# Patient Record
Sex: MALE | Race: WHITE | NOT HISPANIC OR LATINO | Employment: OTHER | URBAN - METROPOLITAN AREA
[De-identification: names, ages, dates, MRNs, and addresses within clinical notes are randomized per-mention and may not be internally consistent; named-entity substitution may affect disease eponyms.]

---

## 2018-08-23 ENCOUNTER — HOSPITAL ENCOUNTER (EMERGENCY)
Facility: CLINIC | Age: 38
Discharge: HOME | End: 2018-08-24
Attending: EMERGENCY MEDICINE | Admitting: EMERGENCY MEDICINE

## 2018-08-23 DIAGNOSIS — T15.91XA FOREIGN BODY OF RIGHT EYE, INITIAL ENCOUNTER: ICD-10-CM

## 2018-08-23 DIAGNOSIS — S05.01XA ABRASION OF RIGHT CORNEA, INITIAL ENCOUNTER: ICD-10-CM

## 2018-08-23 PROCEDURE — 99283 EMERGENCY DEPT VISIT LOW MDM: CPT | Mod: Z6 | Performed by: EMERGENCY MEDICINE

## 2018-08-23 PROCEDURE — 99283 EMERGENCY DEPT VISIT LOW MDM: CPT | Performed by: EMERGENCY MEDICINE

## 2018-08-23 NOTE — ED AVS SNAPSHOT
[facility], Emergency Department    [address]    [place] [address]    Phone:  [phone]    Fax:  [phone]                                       Celso Mathis   MRN: [id number]    Department:  [facility], Emergency Department   Date of Visit:  8/23/2018           Patient Information     Date Of Birth          1980        Your diagnoses for this visit were:     Foreign body of right eye, initial encounter     Abrasion of right cornea, initial encounter        You were seen by Jame Cannon MD.        Discharge Instructions         Use polytrim eye drops as directed.  Use homatropine eyedrops 3 times a day to dilate your eye.  You will need to wear sunglasses outside.  Use artificial tears as needed.  Follow-up with eye clinic tomorrow.  You will be contacted with an appointment time.  Please make an appointment to follow up with Eye Clinic (phone: [phone]).      Discharge References/Attachments     CORNEAL FOREIGN BODY, REMOVED, W/ RUST RING (ENGLISH)      24 Hour Appointment Hotline       To make an appointment at any [place] clinic, call [phone] ([phone]). If you don't have a family doctor or clinic, we will help you find one. [place] clinics are conveniently located to serve the needs of you and your family.             Review of your medicines      START taking        Dose / Directions Last dose taken    homatropine 2 % ophthalmic solution   Dose:  1-2 drop   Quantity:  1 mL        Place 1-2 drops into the right eye 3 times daily   Refills:  0        polyvinyl alcohol 1.4 % ophthalmic solution   Commonly known as:  LIQUIFILM TEARS   Dose:  1 drop   Quantity:  15 mL        Place 1 drop into the right eye as needed for dry eyes   Refills:  0        trimethoprim-polymyxin b ophthalmic solution   Commonly known as:  POLYTRIM   Dose:  1 drop   Quantity:  1 Bottle        Apply 1 drop to eye every 3 hours for 7 days Use while awake.   Refills:  0          Our records  "show that you are taking the medicines listed below. If these are incorrect, please call your family doctor or clinic.        Dose / Directions Last dose taken    HYDROcodone-acetaminophen 5-325 MG per tablet   Commonly known as:  NORCO   Dose:  1 tablet   Quantity:  12 tablet        Take 1 tablet by mouth every 6 hours as needed for moderate to severe pain   Refills:  0                Prescriptions were sent or printed at these locations (3 Prescriptions)                   Other Prescriptions                Printed at Department/Unit printer (3 of 3)         homatropine 2 % ophthalmic solution               polyvinyl alcohol (LIQUIFILM TEARS) 1.4 % ophthalmic solution               trimethoprim-polymyxin b (POLYTRIM) ophthalmic solution                Orders Needing Specimen Collection     None      Pending Results     No orders found for last 3 day(s).            Pending Culture Results     No orders found for last 3 day(s).            Pending Results Instructions     If you had any lab results that were not finalized at the time of your Discharge, you can call the ED Lab Result RN at [phone]. You will be contacted by this team for any positive Lab results or changes in treatment. The nurses are available 7 days a week from 10A to 6:30P.  You can leave a message 24 hours per day and they will return your call.        Thank you for choosing [facility]       Thank you for choosing [facility] for your care. Our goal is always to provide you with excellent care. Hearing back from our patients is one way we can continue to improve our services. Please take a few minutes to complete the written survey that you may receive in the mail after you visit with us. Thank you!        [employer] Information     [employer] lets you send messages to your doctor, view your test results, renew your prescriptions, schedule appointments and more. To sign up, go to [email or web address] . Click on \"Log in\" on the left side of the screen, " "which will take you to the Welcome page. Then click on \"Sign up Now\" on the right side of the page.     You will be asked to enter the access code listed below, as well as some personal information. Please follow the directions to create your username and password.     Your access code is: [email or web address]  Expires: 2018  2:04 AM     Your access code will  in 90 days. If you need help or a new code, please call your [place] clinic or [phone].        Care EveryWhere ID     This is your Care EveryWhere ID. This could be used by other organizations to access your [place] medical records  [id number]        Equal Access to Services     SHAHAB YOO : Neha Gee, meredith hendrix, narinder canales, taz cook. So [facility] [phone].    ATENCIÓN: Si habla español, tiene a garsia disposición servicios gratuitos de asistencia lingüística. Llame al [phone].    We comply with applicable federal civil rights laws and [place] laws. We do not discriminate on the basis of race, color, national origin, age, disability, sex, sexual orientation, or gender identity.            After Visit Summary       This is your record. Keep this with you and show to your community pharmacist(s) and doctor(s) at your next visit.                  "

## 2018-08-23 NOTE — ED AVS SNAPSHOT
[facility], [place], Emergency Department    [address]    [place] [address]    Phone:  [phone]    Fax:  [phone]                                       Celso Mathis   MRN: [id number]    Department:  [facility], Emergency Department   Date of Visit:  8/23/2018           After Visit Summary Signature Page     I have received my discharge instructions, and my questions have been answered. I have discussed any challenges I see with this plan with the nurse or doctor.    ..........................................................................................................................................  Patient/Patient Representative Signature      ..........................................................................................................................................  Patient Representative Print Name and Relationship to Patient    ..................................................               ................................................  Date                                            Time    ..........................................................................................................................................  Reviewed by Signature/Title    ...................................................              ..............................................  Date                                                            Time          22EPIC Rev 08/18

## 2018-08-24 ENCOUNTER — OFFICE VISIT (OUTPATIENT)
Dept: OPHTHALMOLOGY | Facility: CLINIC | Age: 38
End: 2018-08-24

## 2018-08-24 VITALS
BODY MASS INDEX: 23.94 KG/M2 | SYSTOLIC BLOOD PRESSURE: 114 MMHG | DIASTOLIC BLOOD PRESSURE: 75 MMHG | HEART RATE: 69 BPM | WEIGHT: 171 LBS | TEMPERATURE: 98.1 F | HEIGHT: 71 IN | OXYGEN SATURATION: 99 % | RESPIRATION RATE: 16 BRPM

## 2018-08-24 DIAGNOSIS — S05.8X1D SUPERFICIAL INJURY OF RIGHT CORNEA, SUBSEQUENT ENCOUNTER: ICD-10-CM

## 2018-08-24 DIAGNOSIS — T15.01XD FOREIGN BODY OF RIGHT CORNEA, SUBSEQUENT ENCOUNTER: Primary | ICD-10-CM

## 2018-08-24 PROCEDURE — 25000125 ZZHC RX 250: Performed by: EMERGENCY MEDICINE

## 2018-08-24 PROCEDURE — 90715 TDAP VACCINE 7 YRS/> IM: CPT | Performed by: EMERGENCY MEDICINE

## 2018-08-24 PROCEDURE — 90471 IMMUNIZATION ADMIN: CPT

## 2018-08-24 PROCEDURE — 25000128 H RX IP 250 OP 636: Performed by: EMERGENCY MEDICINE

## 2018-08-24 RX ORDER — PROPARACAINE HYDROCHLORIDE 5 MG/ML
1 SOLUTION/ DROPS OPHTHALMIC ONCE
Status: COMPLETED | OUTPATIENT
Start: 2018-08-24 | End: 2018-08-24

## 2018-08-24 RX ORDER — ERYTHROMYCIN 5 MG/G
0.5 OINTMENT OPHTHALMIC 4 TIMES DAILY
Qty: 3.5 G | Refills: 0 | Status: SHIPPED | OUTPATIENT
Start: 2018-08-24 | End: 2018-08-24

## 2018-08-24 RX ORDER — POLYMYXIN B SULFATE AND TRIMETHOPRIM 1; 10000 MG/ML; [USP'U]/ML
1 SOLUTION OPHTHALMIC
Qty: 1 BOTTLE | Refills: 0 | Status: SHIPPED | OUTPATIENT
Start: 2018-08-24 | End: 2018-08-24

## 2018-08-24 RX ORDER — POLYVINYL ALCOHOL 14 MG/ML
1 SOLUTION/ DROPS OPHTHALMIC PRN
Qty: 15 ML | Refills: 0 | Status: SHIPPED | OUTPATIENT
Start: 2018-08-24 | End: 2023-01-12

## 2018-08-24 RX ORDER — POLYMYXIN B SULFATE AND TRIMETHOPRIM 1; 10000 MG/ML; [USP'U]/ML
1 SOLUTION OPHTHALMIC 4 TIMES DAILY
Qty: 1 BOTTLE | Refills: 0 | Status: SHIPPED | OUTPATIENT
Start: 2018-08-24 | End: 2019-05-03

## 2018-08-24 RX ADMIN — PROPARACAINE HYDROCHLORIDE 1 DROP: 5 SOLUTION/ DROPS OPHTHALMIC at 00:17

## 2018-08-24 RX ADMIN — CLOSTRIDIUM TETANI TOXOID ANTIGEN (FORMALDEHYDE INACTIVATED), CORYNEBACTERIUM DIPHTHERIAE TOXOID ANTIGEN (FORMALDEHYDE INACTIVATED), BORDETELLA PERTUSSIS TOXOID ANTIGEN (GLUTARALDEHYDE INACTIVATED), BORDETELLA PERTUSSIS FILAMENTOUS HEMAGGLUTININ ANTIGEN (FORMALDEHYDE INACTIVATED), BORDETELLA PERTUSSIS PERTACTIN ANTIGEN, AND BORDETELLA PERTUSSIS FIMBRIAE 2/3 ANTIGEN 0.5 ML: 5; 2; 2.5; 5; 3; 5 INJECTION, SUSPENSION INTRAMUSCULAR at 00:40

## 2018-08-24 RX ADMIN — FLUORESCEIN SODIUM 1 STRIP: 1 STRIP OPHTHALMIC at 00:17

## 2018-08-24 ASSESSMENT — ENCOUNTER SYMPTOMS
EYE DISCHARGE: 1
EYE PAIN: 1
EYE REDNESS: 1

## 2018-08-24 ASSESSMENT — VISUAL ACUITY
OS_SC+: -1
OD_SC: 20/20
OD: 20/70
METHOD: SNELLEN - LINEAR
OS: 20/40
OS_SC: 20/20
OD_SC+: -1

## 2018-08-24 ASSESSMENT — SLIT LAMP EXAM - LIDS
COMMENTS: NORMAL
COMMENTS: NORMAL

## 2018-08-24 ASSESSMENT — EXTERNAL EXAM - LEFT EYE: OS_EXAM: NORMAL

## 2018-08-24 ASSESSMENT — CONF VISUAL FIELD
OS_NORMAL: 1
OD_NORMAL: 1

## 2018-08-24 ASSESSMENT — EXTERNAL EXAM - RIGHT EYE: OD_EXAM: NORMAL

## 2018-08-24 ASSESSMENT — TONOMETRY
IOP_METHOD: ICARE
OD_IOP_MMHG: 10
OS_IOP_MMHG: 12

## 2018-08-24 NOTE — NURSING NOTE
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

## 2018-08-24 NOTE — ED PROVIDER NOTES
"  History     Chief Complaint   Patient presents with     Foreign Body in Eye     patient reported he was grinding concrete around noon, something went to his right eye. c/o right eye  redness and slight pain     HPI  Celso Mathis is a 38 year old male who was department with a foreign body sensation in his right eye.  The patient states that he was grinding stucco today with a wire mesh embedded in it when he felt a foreign body sensation in his right eye.  He is attempted to irrigate it use eyedrops with persistent symptoms.  He complains of tearing and redness.  He reports some mild blurring of his vision as well.  He is not wearing protective eyewear at the time.  His last tetanus immunization was approximately 10 years ago.  Patient denies contact lens use.  He denies any other injury.  He denies any left eye symptoms.    I have reviewed the Medications, Allergies, Past Medical and Surgical History, and Social History in the Epic system.    Review of Systems   Eyes: Positive for pain, discharge and redness.   All other systems reviewed and are negative.      Physical Exam   BP: 133/89  Pulse: 82  Temp: 98.1  F (36.7  C)  Resp: 19  Height: 180.3 cm (5' 11\")  Weight: 77.6 kg (171 lb)  SpO2: 98 %  Visual Acuity-Left: 20/40  Visual Acuity-Right: 20/70      Physical Exam   Constitutional: He appears well-developed and well-nourished.   HENT:   Head: Normocephalic and atraumatic.   Eyes: Pupils are equal, round, and reactive to light. Foreign body present in the right eye. Right conjunctiva is injected. Right eye exhibits normal extraocular motion.   Slit lamp exam:       The right eye shows corneal abrasion, foreign body and fluorescein uptake. The right eye shows no hyphema and no hypopyon.       Right upper eyelid everted.  No foreign body under lid.   Neurological: He is alert. He has normal strength. Gait normal.   Skin: Skin is warm and dry.   Nursing note and vitals reviewed.      ED Course     ED Course "     Procedures        Foreign body removed with cotton tip applicator.  Residual rust ring present.    Critical Care time:    Medications   proparacaine (ALCAINE) 0.5 % ophthalmic solution 1 drop (1 drop Right Eye Given by Other Clinician 8/24/18 0017)   fluorescein 1 mg (FUL-ROSMERY) ophthalmic strip 1 strip (1 strip Both Eyes Given by Other Clinician 8/24/18 0017)   Tdap (tetanus-diphtheria-acell pertussis) (ADACEL) injection 0.5 mL (0.5 mLs Intramuscular Given 8/24/18 0040)      Discussed with ophthalmology who evaluated the patient in the emergency department.  See note for complete details.       Labs Ordered and Resulted from Time of ED Arrival Up to the Time of Departure from the ED - No data to display         Assessments & Plan (with Medical Decision Making)   38 year old male to the emergency department with right eye pain and redness after grinding stucco with wire mesh today at work.  The patient's tetanus status was updated.  He will be discharged home with Polytrim eyedrops, homatropine eyedrops, and artificial tears..  Ophthalmology follow-up for rust ring removal.  I have reviewed the nursing notes.    I have reviewed the findings, diagnosis, plan and need for follow up with the patient.    New Prescriptions    HOMATROPINE 2 % OPHTHALMIC SOLUTION    Place 1-2 drops into the right eye 3 times daily    POLYVINYL ALCOHOL (LIQUIFILM TEARS) 1.4 % OPHTHALMIC SOLUTION    Place 1 drop into the right eye as needed for dry eyes    TRIMETHOPRIM-POLYMYXIN B (POLYTRIM) OPHTHALMIC SOLUTION    Apply 1 drop to eye every 3 hours for 7 days Use while awake.       Final diagnoses:   Foreign body of right eye, initial encounter   Abrasion of right cornea, initial encounter       8/23/2018   [facility], [place], EMERGENCY DEPARTMENT     Jame Cannon MD  08/24/18 5431

## 2018-08-24 NOTE — CONSULTS
OPHTHALMOLOGY CONSULT NOTE  08/24/18    Patient: Celso Mathis  Consulted by: [facility]  Reason for Consult: corneal foreign body    HISTORY OF PRESENTING ILLNESS:     Celso Mathis is a 38 year old male who presents to ED with RIGHT eye pain. States that he was cutting stucco this afternoon when he felt a vijaya in his RIGHT eye. Had FBS with blinking and eye irritation. Attempted frequent AT's without improvement.     States vision is a little blurry and has some tearing in his right eye. No photophobia. No deep eye pain or pain with eye movement, no diplopia. Denies a gush of fluid at time of incident.     No complaints with LEFT eye.    Per ED staff, was able to identify and remove a metallic foreign body from RIGHT cornea at 2 o'clock position. Rust ring remained.       Review of systems were otherwise negative except for that which has been stated above.      OCULAR/MEDICAL/SURGICAL HISTORIES:     Past Ocular History:  None, no glasses or contacts    History reviewed. No pertinent past medical history.    Past Surgical History:   Procedure Laterality Date     ORTHOPEDIC SURGERY  2010    Large bruise that had to be evacuated.       EXAMINATION:     Visual Acuity: Right Eye 20/25 -2 ; Left Eye 20/25 -1   Pupils: Equal and reactive to light and accomodation with no afferent pupillary defect.  - 3mm dark to 2mm light each eye, brisk, no APD    Intraocular Pressure: RE  10 ; LE 10  mmHg by tonopen (<5% error).   Motility: RE Full; LE Full  Confrontational Visual Field: RE Full; LE Full     External/Slit Lamp Exam   RIGHT EYE   Lids/Lashes: No Abnormality, no foreign body appreciated on eversion of upper/lower lid   Conj/Sclera: diffuse 2+ injection, no appreciated foreign body in fornix   Cornea:  0.3mm superficial loreto brown lesion at 2 o'clock position just out of visual axis (metallic FB removed by ED staff with q-tip), only superficial involvement into stroma (shadow from rust ring appreciated), no other  appreciated epithelial defect on exam with fluorescein    Ant Chamber:  Deep and Quiet with no cell/flare appreciated   Iris: Round and Reactive   Lens: Clear  LEFT   Lids/lashes: No Abnormality   Conj/Sclera: 1+ injection    Cornea:  Clear, no fluorescein uptake   Ant Chamber:  Deep and Quiet   Iris: Round and Reactive   Lens:Clear    Dilated Fundus Exam  Eyes Dilated? no    RIGHT:   Anterior Vitreous: Clear   Media: Clear   Optic Nerve: Normal Contours and Size    Labs/Studies/Imaging Performed  None     ASSESSMENT/PLAN:     Celso Mathis is a 38 year old male who presents with corneal foreign body, RIGHT eye, after cutting stucco/metal without eye protection this afternoon. VA and IOP good, no concern for ruptured globe. Foreign body removed by ED staff, residual rust stain present (0.3mm), with no associated AC rxn or hyphema. Will send home on antibiotic ointment and cycloplegic for photosensitivity. Will plan to follow-up in 1-3 days. Counseled patient on importance of returning if pain fails to improve or vision becomes compromised.     PLAN:   - Polysporin ointment TID, RIGHT eye  - Homatropine 5% TID, RIGHT eye  - Frequent (q1-2 hr) artificial tears, each eye   - Follow-up in 1-3 days in clinic (will have clinic arrange with patient)  - Return ASAP if pain worsens or vision compromise     It is our pleasure to participate in this patient's care and treatment. Please contact us with any further questions or concerns.    Care plan discussed with Dr. Guerin.    Mario Alberto Peterson MD  Ophthalmology Resident  PGY-2

## 2018-08-24 NOTE — MR AVS SNAPSHOT
After Visit Summary   2018    Celso Mathis    MRN: [id number]           Patient Information     Date Of Birth          1980        Visit Information        Provider Department      2018 10:40 AM Yoan Lawson OD [facility]        Today's Diagnoses     Foreign body of right cornea, subsequent encounter    -  1    Superficial injury of right cornea, subsequent encounter           Follow-ups after your visit        Follow-up notes from your care team     Return in about 4 days (around 2018) for Follow Up.      Your next 10 appointments already scheduled     Aug 28, 2018  5:00 PM CDT   (Arrive by 4:45 PM)   RETURN GENERAL with [facility] ([facility])    [address] [facility]  [address] [facility] [address] [phone]              Who to contact     Please call your clinic at [phone] to:    Ask questions about your health    Make or cancel appointments    Discuss your medicines    Learn about your test results    Speak to your doctor            Additional Information About Your Visit        MyChart Information     [employer] is an electronic gateway that provides easy, online access to your medical records. With [employer], you can request a clinic appointment, read your test results, renew a prescription or communicate with your care team.     To sign up for [employer] visit the website at [email or web address]   You will be asked to enter the access code listed below, as well as some personal information. Please follow the directions to create your username and password.     Your access code is: [email or web address]  Expires: 2018  2:04 AM     Your access code will  in 90 days. If you need help or a new code, please contact your [facility] or call [phone] for assistance.        Care EveryWhere ID     This is your Care EveryWhere ID. This could be used by other  organizations to access your [place] medical records  [id number]         Blood Pressure from Last 3 Encounters:   08/24/18 114/75   08/27/16 (!) 134/93   06/17/15 (!) 145/95    Weight from Last 3 Encounters:   08/24/18 77.6 kg (171 lb)   08/27/16 83.9 kg (185 lb)   06/16/15 83.9 kg (185 lb)              We Performed the Following     Foreign Body Removal, Cornea w/ Slit Lamp          Today's Medication Changes          These changes are accurate as of 8/24/18 11:59 PM.  If you have any questions, ask your nurse or doctor.               These medicines have changed or have updated prescriptions.        Dose/Directions    trimethoprim-polymyxin b ophthalmic solution   Commonly known as:  POLYTRIM   This may have changed:  when to take this   Used for:  Superficial injury of right cornea, subsequent encounter   Changed by:  Yoan Lawson, OD        Dose:  1 drop   Apply 1 drop to eye 4 times daily Use while awake.   Quantity:  1 Bottle   Refills:  0            Where to get your medicines      These medications were sent to [facility] [address] [place] [address] [address] [address]    Hours:  TRANSPLANT PHONE NUMBER [phone] Phone:  [phone]     trimethoprim-polymyxin b ophthalmic solution                Primary Care Provider Fax #    Physician No Ref-Primary [phone]       No address on file        Equal Access to Services     SHAHAB YOO AH: Neha Gee, waaxda luqadaha, qaybta kaalmada taz canales. So [facility] [phone].    ATENCIÓN: Si habla español, tiene a garsia disposición servicios gratuitos de asistencia lingüística. Llame al [phone].    We comply with applicable federal civil rights laws and [place] laws. We do not discriminate on the basis of race, color, national origin, age, disability, sex, sexual orientation, or gender identity.            Thank you!      Thank you for choosing [facility]OLOG[facility]  for your care. Our goal is always to provide you with excellent care. Hearing back from our patients is one way we can continue to improve our services. Please take a few minutes to complete the written survey that you may receive in the mail after your visit with us. Thank you!             Your Updated Medication List - Protect others around you: Learn how to safely use, store and throw away your medicines at www.disposemymeds.org.          This list is accurate as of 8/24/18 11:59 PM.  Always use your most recent med list.                   Brand Name Dispense Instructions for use Diagnosis    homatropine 2 % ophthalmic solution     1 mL    Place 1-2 drops into the right eye 3 times daily        HYDROcodone-acetaminophen 5-325 MG per tablet    NORCO    12 tablet    Take 1 tablet by mouth every 6 hours as needed for moderate to severe pain    Pain of right lower extremity, Hematoma of hip, right, initial encounter       polyvinyl alcohol 1.4 % ophthalmic solution    LIQUIFILM TEARS    15 mL    Place 1 drop into the right eye as needed for dry eyes        trimethoprim-polymyxin b ophthalmic solution    POLYTRIM    1 Bottle    Apply 1 drop to eye 4 times daily Use while awake.    Superficial injury of right cornea, subsequent encounter

## 2018-08-24 NOTE — DISCHARGE INSTRUCTIONS
Use polytrim eye drops as directed.  Use homatropine eyedrops 3 times a day to dilate your eye.  You will need to wear sunglasses outside.  Use artificial tears as needed.  Follow-up with eye clinic tomorrow.  You will be contacted with an appointment time.  Please make an appointment to follow up with Eye Clinic (phone: [phone]).

## 2018-08-27 NOTE — PROGRESS NOTES
Assessment/Plan  (T15.01XD) Foreign body of right cornea, subsequent encounter  (primary encounter diagnosis)  Comment: Remaining metallic foreign body removed successfully with golf club spud, rust ring removed with [place] brush, resulting abrasion ~1mm  Plan: Foreign Body Removal, Cornea w/ Slit Lamp         Educated patient on clinical findings. Foreign body removed successfully. Prescribed Polytrim qid right eye and recommended non-preserved artificial tears q1h both eyes. Return to clinic in 4 days (offered 3, patient deferred) for follow-up, or sooner if symptoms worsen.    (S05.8X1D) Superficial injury of right cornea, subsequent encounter  Comment: See above  Plan: trimethoprim-polymyxin b (POLYTRIM) ophthalmic solution    Return to clinic in 4 days for follow-up.    Complete documentation of historical and exam elements from today's encounter can  be found in the full encounter summary report (not reduplicated in this progress  note). I personally obtained the chief complaint(s) and history of present illness. I  confirmed and edited as necessary the review of systems, past medical/surgical  history, family history, social history, and examination findings as documented by  others; and I examined the patient myself. I personally reviewed the relevant tests,  images, and reports as documented above. I formulated and edited as necessary the  assessment and plan and discussed the findings and management plan with the  patient and family.    Yoan Lawson OD, FAAO

## 2018-08-30 ENCOUNTER — TELEPHONE (OUTPATIENT)
Dept: OPHTHALMOLOGY | Facility: CLINIC | Age: 38
End: 2018-08-30

## 2019-05-03 ENCOUNTER — HOSPITAL ENCOUNTER (EMERGENCY)
Facility: CLINIC | Age: 39
Discharge: HOME | End: 2019-05-03
Attending: NURSE PRACTITIONER | Admitting: NURSE PRACTITIONER

## 2019-05-03 VITALS
TEMPERATURE: 98 F | OXYGEN SATURATION: 99 % | HEIGHT: 70 IN | DIASTOLIC BLOOD PRESSURE: 80 MMHG | SYSTOLIC BLOOD PRESSURE: 131 MMHG | BODY MASS INDEX: 24.34 KG/M2 | WEIGHT: 170 LBS | RESPIRATION RATE: 16 BRPM

## 2019-05-03 DIAGNOSIS — T15.92XA FOREIGN BODY OF LEFT EYE, INITIAL ENCOUNTER: ICD-10-CM

## 2019-05-03 PROCEDURE — 99283 EMERGENCY DEPT VISIT LOW MDM: CPT

## 2019-05-03 PROCEDURE — 25000125 ZZHC RX 250: Performed by: NURSE PRACTITIONER

## 2019-05-03 PROCEDURE — 65205 REMOVE FOREIGN BODY FROM EYE: CPT | Mod: LT

## 2019-05-03 PROCEDURE — 25000125 ZZHC RX 250

## 2019-05-03 RX ORDER — POLYMYXIN B SULFATE AND TRIMETHOPRIM 1; 10000 MG/ML; [USP'U]/ML
1-2 SOLUTION OPHTHALMIC EVERY 4 HOURS
Qty: 1 BOTTLE | Refills: 0 | Status: SHIPPED | OUTPATIENT
Start: 2019-05-03 | End: 2019-05-08

## 2019-05-03 RX ORDER — PROPARACAINE HYDROCHLORIDE 5 MG/ML
2 SOLUTION/ DROPS OPHTHALMIC ONCE
Status: COMPLETED | OUTPATIENT
Start: 2019-05-03 | End: 2019-05-03

## 2019-05-03 RX ADMIN — FLUORESCEIN SODIUM: 1 STRIP OPHTHALMIC at 15:40

## 2019-05-03 RX ADMIN — PROPARACAINE HYDROCHLORIDE 2 DROP: 5 SOLUTION/ DROPS OPHTHALMIC at 15:20

## 2019-05-03 ASSESSMENT — ENCOUNTER SYMPTOMS
VOMITING: 0
FEVER: 0
EYE ITCHING: 1
EYE DISCHARGE: 1
HEADACHES: 0
EYE PAIN: 1
NAUSEA: 0

## 2019-05-03 ASSESSMENT — MIFFLIN-ST. JEOR: SCORE: 1702.36

## 2019-05-03 NOTE — ED PROVIDER NOTES
"  History     Chief Complaint:  Foreign Body in Eye       HPI   Celso Mathis is a 39 year old male who presents with left eye pain. Patient was seen at Urgent Care earlier today for removal of foreign body without success. Sent to emergency department for further cares. 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 This happened to him last year, and he did not present because he did not want to incur a large bill. Endorses eye pain, itching, and watering. Patient does not wear contacts. Denies visual changes.     Tdap: Up to date, per patient     Allergies:  Penicillins      Medications:    The patient is currently on no regular medications.     Past Medical History:    Rhabdomyolysis   Hematoma of hip    Past Surgical History:    Orthopedic surgery     Family History:    History reviewed. No pertinent family history.      Social History:  The patient was alone.  Smoking Status: Current every day smoker 1.00 PPD   Smokeless Tobacco: Never  Alcohol Use: No   Marital Status:  Single      Review of Systems   Constitutional: Negative for fever.   Eyes: Positive for pain, discharge (watering) and itching. Negative for visual disturbance.   Gastrointestinal: Negative for nausea and vomiting.   Neurological: Negative for headaches.   All other systems reviewed and are negative.      Physical Exam     Patient Vitals for the past 24 hrs:   BP Temp Temp src Heart Rate Resp SpO2 Height Weight   05/03/19 1524 131/80 98  F (36.7  C) Oral 56 16 99 % 1.778 m (5' 10\") 77.1 kg (170 lb)      Physical Exam  General: Alert, no obvious discomfort, well kept   HENT:  Normal voice, No lymphadenopathy. Foreign body left eye approximately 7 o'clock. After removal, ulceration in this area. No rust ring.  Fluorescein slit lamp exam  Eyes:  The pupils are equal, round, and reactive to light, Conjunctiva normal, No scleral icterus   Neck:  Normal " range of motion  CV:  Normal Pulses  Resp:  Non-labored, No cough  MS:  Normal muscular tone, moves all extremities  Skin:  No rash or acute skin lesions noted  Neuro: Speech is normal and fluent  Psych:  Awake. Alert.  Normal affect.  Appropriate interactions. Good eye contact    Emergency Department Course     Procedures:    Foreign Body Removal        LOCATION:  Left eye       FOREIGN BODY: Metal/[place]    PROCEDURE: Eye was prepped with 2 drops proparacaine. The small piece of metal/contrete was removed using the beveled edge of an 18 gauge needle and the foreign body was successfully removed. There were no immediate complications. The patient tolerated the procedure well.     Interventions:  1527 - Proparacaine 0.5% ophthalmic solution 2 drops, left eye   1534 - fluorescein 1mg ophthalmic strip      Emergency Department Course:  Nursing notes and vitals reviewed.    1527: I performed an exam of the patient as documented above.     1531: Removed foreign body.     1534: Took patient to eye room for further evaluation.     Findings and plan explained to the Patient. Patient discharged home with instructions regarding supportive care, medications, and reasons to return. The importance of close follow-up was reviewed. The patient was prescribed Polytrim ophthalmic solution.     Impression & Plan      Medical Decision Making:  Celso Mathis is a 39 year old male who presents for evaluation of a foreign body in the left eye.  This was successfully removed, see above procedure note. No signs of complications of the foreign body including abscess, cellulitis, necrotizing fascitis, penetration of vascular or nerve structures, etc.  Patient is more comfortable after removal.  Will have them follow up with primary care for wound check.  Risk of infection discussed.      Diagnosis:    ICD-10-CM   1. Foreign body of left eye, initial encounter T15.92XA       Disposition:  Discharged to home.     Discharge  Medications:  trimethoprim-polymyxin b 78211-3.1 UNIT/ML-% ophthalmic solution  Commonly known as:  POLYTRIM  1-2 drops, Left Eye, EVERY 4 HOURS  What changed:      how much to take    how to take this    when to take this    additional instructions       Scribe Disclosure:  I, Ana Maria Bolden, am serving as a scribe at 3:21 PM on 5/3/2019 to document services personally performed by Willie Ceja APRN based on my observations and the provider's statements to me.  5/3/2019    EMERGENCY DEPARTMENT       Willie Ceja APRN CNP  05/03/19 7844

## 2019-05-03 NOTE — ED AVS SNAPSHOT
Emergency Department  [address] [place] [address]  Phone:  [phone]  Fax:  [phone]                                    Celso Mathis   MRN: [id number]    Department:   Emergency Department   Date of Visit:  5/3/2019           After Visit Summary Signature Page    I have received my discharge instructions, and my questions have been answered. I have discussed any challenges I see with this plan with the nurse or doctor.    ..........................................................................................................................................  Patient/Patient Representative Signature      ..........................................................................................................................................  Patient Representative Print Name and Relationship to Patient    ..................................................               ................................................  Date                                   Time    ..........................................................................................................................................  Reviewed by Signature/Title    ...................................................              ..............................................  Date                                               Time          22EPIC Rev 08/18

## 2020-10-08 ENCOUNTER — HOSPITAL ENCOUNTER (EMERGENCY)
Facility: CLINIC | Age: 40
Discharge: HOME | End: 2020-10-08
Attending: EMERGENCY MEDICINE | Admitting: EMERGENCY MEDICINE

## 2020-10-08 ENCOUNTER — APPOINTMENT (OUTPATIENT)
Dept: GENERAL RADIOLOGY | Facility: CLINIC | Age: 40
End: 2020-10-08
Attending: EMERGENCY MEDICINE

## 2020-10-08 VITALS
TEMPERATURE: 98.7 F | RESPIRATION RATE: 16 BRPM | OXYGEN SATURATION: 99 % | DIASTOLIC BLOOD PRESSURE: 83 MMHG | SYSTOLIC BLOOD PRESSURE: 126 MMHG | HEART RATE: 69 BPM

## 2020-10-08 DIAGNOSIS — S89.91XA KNEE INJURY, RIGHT, INITIAL ENCOUNTER: ICD-10-CM

## 2020-10-08 PROCEDURE — 73560 X-RAY EXAM OF KNEE 1 OR 2: CPT | Mod: RT

## 2020-10-08 PROCEDURE — 99284 EMERGENCY DEPT VISIT MOD MDM: CPT | Performed by: EMERGENCY MEDICINE

## 2020-10-08 PROCEDURE — 29505 APPLICATION LONG LEG SPLINT: CPT | Performed by: EMERGENCY MEDICINE

## 2020-10-08 PROCEDURE — 250N000013 HC RX MED GY IP 250 OP 250 PS 637: Performed by: EMERGENCY MEDICINE

## 2020-10-08 PROCEDURE — 99284 EMERGENCY DEPT VISIT MOD MDM: CPT | Mod: 25 | Performed by: EMERGENCY MEDICINE

## 2020-10-08 RX ORDER — IBUPROFEN 600 MG/1
600 TABLET, FILM COATED ORAL ONCE
Status: COMPLETED | OUTPATIENT
Start: 2020-10-08 | End: 2020-10-08

## 2020-10-08 RX ORDER — HYDROCODONE BITARTRATE AND ACETAMINOPHEN 5; 325 MG/1; MG/1
1 TABLET ORAL EVERY 6 HOURS PRN
Qty: 10 TABLET | Refills: 0 | Status: SHIPPED | OUTPATIENT
Start: 2020-10-08 | End: 2020-10-11

## 2020-10-08 RX ORDER — SODIUM CHLORIDE, SODIUM LACTATE, POTASSIUM CHLORIDE, CALCIUM CHLORIDE 600; 310; 30; 20 MG/100ML; MG/100ML; MG/100ML; MG/100ML
INJECTION, SOLUTION INTRAVENOUS
Status: DISCONTINUED
Start: 2020-10-08 | End: 2020-10-08 | Stop reason: HOSPADM

## 2020-10-08 RX ADMIN — IBUPROFEN 600 MG: 600 TABLET, FILM COATED ORAL at 11:39

## 2020-10-08 ASSESSMENT — ENCOUNTER SYMPTOMS
HEADACHES: 0
DIFFICULTY URINATING: 0
FEVER: 0
COLOR CHANGE: 0
CONFUSION: 0
NECK STIFFNESS: 0
SHORTNESS OF BREATH: 0
ARTHRALGIAS: 0
ABDOMINAL PAIN: 0
NUMBNESS: 0
EYE REDNESS: 0

## 2020-10-08 NOTE — ED PROVIDER NOTES
ED Provider Note  [facility]      History     Chief Complaint   Patient presents with     Knee Pain     skate boarding last night; right knee popped/moved; pain getting worse: movement minmal: toe touching weight bearing     The history is provided by the patient.     Celso Mathis is a 40 year old otherwise healthy male who presents to the Emergency Department for evaluation of right knee pain.  Patient reports that he was skateboarding down a ramp last night at approximately 8 PM when he went to run off of his skateboard and felt his right knee twist and buckle underneath him.  Patient thinks he heard a pop from the knee as well.  He had immediate sharp pain in the right knee and he reports that this pain has been getting progressively worse since last night.  The patient did begin noticing right knee swelling last night and he has been icing it.  Patient took ibuprofen for his pain and swelling last night, but he has not taken any medications today.  He denies any numbness or tingling in his right foot.  The patient states that the right knee pain is exacerbated with putting pressure on that leg or bending/extending the knee.  He denies any prior injuries to the right knee.    Past Medical History  History reviewed. No pertinent past medical history.  Past Surgical History:   Procedure Laterality Date     ORTHOPEDIC SURGERY  2010    Large bruise that had to be evacuated.          HYDROcodone-acetaminophen (NORCO) 5-325 MG tablet       homatropine 2 % ophthalmic solution       HYDROcodone-acetaminophen (NORCO) 5-325 MG per tablet       polyvinyl alcohol (LIQUIFILM TEARS) 1.4 % ophthalmic solution      Allergies   Allergen Reactions     Penicillins Unknown     Family History  No family history on file.  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 No additional pertinent items.       Review of Systems   Constitutional: Negative for fever.   HENT: Negative for congestion.    Eyes: Negative for redness.   Respiratory: Negative for shortness of breath.    Cardiovascular: Negative for chest pain.   Gastrointestinal: Negative for abdominal pain.   Genitourinary: Negative for difficulty urinating.   Musculoskeletal: Negative for arthralgias and neck stiffness.        Positive for right knee pain  Positive for right knee swelling   Skin: Negative for color change.   Neurological: Negative for numbness and headaches.   Psychiatric/Behavioral: Negative for confusion.   All other systems reviewed and are negative.      Physical Exam   BP: (!) 139/91  Pulse: 88  Temp: 98.4  F (36.9  C)  Resp: 16  SpO2: 97 %  Physical Exam  Constitutional:       Appearance: He is well-developed.   HENT:      Head: Normocephalic.   Eyes:      Pupils: Pupils are equal, round, and reactive to light.   Neck:      Musculoskeletal: Normal range of motion and neck supple.   Cardiovascular:      Rate and Rhythm: Normal rate and regular rhythm.   Pulmonary:      Effort: Pulmonary effort is normal.      Breath sounds: Normal breath sounds.   Abdominal:      General: Bowel sounds are normal.      Palpations: Abdomen is soft.   Musculoskeletal:      Right knee: He exhibits decreased range of motion, swelling, effusion and bony tenderness. He exhibits no laceration, no erythema, normal alignment, no LCL laxity and normal patellar mobility. Tenderness found. Medial joint line tenderness noted.   Skin:     General: Skin is warm and dry.      Capillary Refill: Capillary refill takes less than 2 seconds.   Neurological:      Mental Status:  He is alert and oriented to person, place, and time.         ED Course      Procedures     10:29 AM  The patient was seen and examined by Julius Rogers MD in Room ED19.              Results for orders placed or performed during the hospital encounter of 10/08/20   XR Knee Right 1/2 Views     Status: None (Preliminary result)    Narrative    RIGHT KNEE ONE TO TWO VIEWS  10/8/2020 11:09 AM     HISTORY: Pain, injury.    COMPARISON: None.      Impression    IMPRESSION: Joint spaces are preserved. No evidence of fracture. Large  joint effusion. Small spur medial tibial metaphysis.     Medications   lactated ringers injection (has no administration in time range)   ibuprofen (ADVIL/MOTRIN) tablet 600 mg (600 mg Oral Given 10/8/20 1139)        Assessments & Plan (with Medical Decision Making)   40-year-old male who presents for evaluation right knee injury.  Differential included fracture, dislocation, internal derangement, sprain.  Examination reveals tenderness over the medial joint space with decreased range of motion.  Patient would not tolerate test for ligamentous laxity.  Knee x-ray was obtained revealing large effusion and no evidence of fracture.  Patient will be given knee immobilizer with instructions on use of ibuprofen, elevation, ice, crutches.  I recommended follow-up with her orthopedist in the next 1 to 2 weeks for reassessment as I suspect internal derangement.  Patient was given Vicodin for breakthrough pain.    I have reviewed the nursing notes. I have reviewed the findings, diagnosis, plan and need for follow up with the patient.    New Prescriptions    HYDROCODONE-ACETAMINOPHEN (NORCO) 5-325 MG TABLET    Take 1 tablet by mouth every 6 hours as needed for severe pain       Final diagnoses:   Knee injury, right, initial encounter       --  IMic, am serving as a trained medical scribe to document services personally performed by Julius Rogers MD, based on the provider's statements to me.      I, Julius Rogers MD, was physically present and have reviewed and verified the accuracy of this note documented by Mic Stevenson.    Julius Rogers MD  [facility] EMERGENCY DEPARTMENT  10/8/2020     Julius Rogers MD  10/08/20 1158

## 2020-10-12 ENCOUNTER — OFFICE VISIT (OUTPATIENT)
Dept: ORTHOPEDICS | Facility: CLINIC | Age: 40
End: 2020-10-12

## 2020-10-12 VITALS — HEIGHT: 71 IN | WEIGHT: 170 LBS | BODY MASS INDEX: 23.8 KG/M2

## 2020-10-12 DIAGNOSIS — M25.561 ACUTE PAIN OF RIGHT KNEE: Primary | ICD-10-CM

## 2020-10-12 PROCEDURE — 99203 OFFICE O/P NEW LOW 30 MIN: CPT | Mod: GC | Performed by: ORTHOPAEDIC SURGERY

## 2020-10-12 ASSESSMENT — MIFFLIN-ST. JEOR: SCORE: 1713.24

## 2020-10-12 NOTE — PROGRESS NOTES
Patient seen and examined with the resident.     Assesment: Skateboard injury last week.  Specific event.  Felt a pop.  Lachman testing increased.  Presumed ACL tear    Plan: MRI of the knee.  Follow-up afterwards.    I agree with history, physical and imaging as well as the assessment and plan as detailed by Dr. Romeo.

## 2020-10-12 NOTE — LETTER
Date:October 13, 2020      Patient was self referred, no letter generated. Do not send.        [facility] Health Information

## 2020-10-12 NOTE — LETTER
10/12/2020         RE: Celso Mathis  [address]  [facility] [address]        Dear Colleague,    Thank you for referring your patient, Celso Mathis, to the [facility]. Please see a copy of my visit note below.    CHIEF CONCERN:   Chief Complaint   Patient presents with     Consult     Right knee        HISTORY:   Celso Mathis is a 40 year old male who presents to clinic today for evaluation of right knee pain. Pt states that on 10/7/20 he had a twisting fall from his skateboard and had acute onset of pain and swelling of the right knee. Denies any significant antecedent knee pain. Does feel like he felt/heard a pop in his knee at the time. Sought care in the ED and was given a knee immobilizer, NSAIDs, and follow up with orthopaedics after XR returned negative. Pt states that his swelling and pain have improved, but still struggling with range of motion and ambulation. Is using a crutch to walk. Denies any contralateral symptoms. Denies any numbness or tingling. Denies any other orthopaedic complaints.    PAST MEDICAL HISTORY: (Reviewed with the patient and in the [facility] medical record)  No past medical history on file.    PAST SURGICAL HISTORY: (Reviewed with the patient and in the [facility] medical record)  Past Surgical History:   Procedure Laterality Date     ORTHOPEDIC SURGERY  2010    Large bruise that had to be evacuated.       MEDICATIONS: (Reviewed with the patient and in the [facility] medical record)  Notable medications include:  Current Outpatient Medications   Medication Sig Dispense Refill     homatropine 2 % ophthalmic solution Place 1-2 drops into the right eye 3 times daily 1 mL 0     HYDROcodone-acetaminophen (NORCO) 5-325 MG per tablet Take 1 tablet by mouth every 6 hours as needed for moderate to severe pain 12 tablet 0     polyvinyl alcohol (LIQUIFILM TEARS) 1.4 % ophthalmic solution Place 1 drop into the right eye as needed for dry eyes 15 mL 0     "    ALLERGIES: (Reviewed with the patient and in the [facility] medical record)  Allergies   Allergen Reactions     Penicillins Unknown       SOCIAL HISTORY: (Reviewed with the patient and in the medical record)  --Tobacco: 1/2 pack per day  --Occupation: window and door installation  --Avocation/Sport: skateboarding    FAMILY HISTORY: (Reviewed with the patient and in the medical record)  -- No family history of bleeding, clotting, or difficulty with anesthesia    REVIEW OF SYSTEMS: (Reviewed with the patient and on the health intake form)  -- A comprehensive 10 point review of systems was conducted and is negative except as noted in the HPI    EXAM:   General: Awake, Alert and Oriented, No acute Distress. Articulate and Interactive  Ht 1.803 m (5' 11\")   Wt 77.1 kg (170 lb)   BMI 23.71 kg/m       Right lower extremity and knee exam:    Skin is Warm and Well perfused, no suggestion of infection, no previous incisions    Large effusion. Some medial joint line pain. No lateral joint line pain. TTP over tibial MCL insertion as well. No other tenderness to palpation    Active ROM 20-75, limited due to pain and swelling, unable to perform McMurrays    Stable to varus stress; slight opening 1+ and pain with valgus stress    Positive Lachmans, negative posterior drawer    Negative patellar tilt, negative patellar laxity, no J-sign present    EHL/FHL/TA/GS 5/5    Sensation intact L3-S1    Brisk distal capillary refill    IMAGING:  Plain Radiographs: AP, lateral views of right knee reviewed and demonstrate no acute osseous abnormality and no significant degenerative change; effusion present    ASSESSMENT:  1. 40 year old male with right knee pain and swelling after a fall; concern for ACL tear, MCL injury    PLAN:   1. Given history and mechanism, will obtain right knee MRI to evaluate further extent of injury  2. Continue with activity as tolerated and NSAIDs and icing for pain control  3. Follow up after MRI results are " complete to discuss further treatment plan    Gene Romeo MD  Fellow, Sports Medicine & Shoulder  [facility]        Patient seen and examined with the resident.     Assesment: Skateboard injury last week.  Specific event.  Felt a pop.  Lachman testing increased.  Presumed ACL tear    Plan: MRI of the knee.  Follow-up afterwards.    I agree with history, physical and imaging as well as the assessment and plan as detailed by Dr. Romeo.         Again, thank you for allowing me to participate in the care of your patient.        Sincerely,        Ron Lamb MD

## 2020-10-12 NOTE — PROGRESS NOTES
CHIEF CONCERN:   Chief Complaint   Patient presents with     Consult     Right knee        HISTORY:   Celso Mathis is a 40 year old male who presents to clinic today for evaluation of right knee pain. Pt states that on 10/7/20 he had a twisting fall from his skateboard and had acute onset of pain and swelling of the right knee. Denies any significant antecedent knee pain. Does feel like he felt/heard a pop in his knee at the time. Sought care in the ED and was given a knee immobilizer, NSAIDs, and follow up with orthopaedics after XR returned negative. Pt states that his swelling and pain have improved, but still struggling with range of motion and ambulation. Is using a crutch to walk. Denies any contralateral symptoms. Denies any numbness or tingling. Denies any other orthopaedic complaints.    PAST MEDICAL HISTORY: (Reviewed with the patient and in the [facility] medical record)  No past medical history on file.    PAST SURGICAL HISTORY: (Reviewed with the patient and in the [facility] medical record)  Past Surgical History:   Procedure Laterality Date     ORTHOPEDIC SURGERY  2010    Large bruise that had to be evacuated.       MEDICATIONS: (Reviewed with the patient and in the [facility] medical record)  Notable medications include:  Current Outpatient Medications   Medication Sig Dispense Refill     homatropine 2 % ophthalmic solution Place 1-2 drops into the right eye 3 times daily 1 mL 0     HYDROcodone-acetaminophen (NORCO) 5-325 MG per tablet Take 1 tablet by mouth every 6 hours as needed for moderate to severe pain 12 tablet 0     polyvinyl alcohol (LIQUIFILM TEARS) 1.4 % ophthalmic solution Place 1 drop into the right eye as needed for dry eyes 15 mL 0        ALLERGIES: (Reviewed with the patient and in the [facility] medical record)  Allergies   Allergen Reactions     Penicillins Unknown       SOCIAL HISTORY: (Reviewed with the patient and in the medical record)  --Tobacco: 1/2 pack per day  --Occupation: window and door  "installation  --Avocation/Sport: skateboarding    FAMILY HISTORY: (Reviewed with the patient and in the medical record)  -- No family history of bleeding, clotting, or difficulty with anesthesia    REVIEW OF SYSTEMS: (Reviewed with the patient and on the health intake form)  -- A comprehensive 10 point review of systems was conducted and is negative except as noted in the HPI    EXAM:   General: Awake, Alert and Oriented, No acute Distress. Articulate and Interactive  Ht 1.803 m (5' 11\")   Wt 77.1 kg (170 lb)   BMI 23.71 kg/m       Right lower extremity and knee exam:    Skin is Warm and Well perfused, no suggestion of infection, no previous incisions    Large effusion. Some medial joint line pain. No lateral joint line pain. TTP over tibial MCL insertion as well. No other tenderness to palpation    Active ROM 20-75, limited due to pain and swelling, unable to perform McMurrays    Stable to varus stress; slight opening 1+ and pain with valgus stress    Positive Lachmans, negative posterior drawer    Negative patellar tilt, negative patellar laxity, no J-sign present    EHL/FHL/TA/GS 5/5    Sensation intact L3-S1    Brisk distal capillary refill    IMAGING:  Plain Radiographs: AP, lateral views of right knee reviewed and demonstrate no acute osseous abnormality and no significant degenerative change; effusion present    ASSESSMENT:  1. 40 year old male with right knee pain and swelling after a fall; concern for ACL tear, MCL injury    PLAN:   1. Given history and mechanism, will obtain right knee MRI to evaluate further extent of injury  2. Continue with activity as tolerated and NSAIDs and icing for pain control  3. Follow up after MRI results are complete to discuss further treatment plan    Gene Romeo MD  Fellow, Sports Medicine & Shoulder  [facility]      "

## 2020-10-12 NOTE — NURSING NOTE
"Reason For Visit:   Chief Complaint   Patient presents with     Consult     Right knee         ?  No  Occupation: [employer]  Currently working? Yes.  Work status?  Full time.  Date of injury: 10/7/20  Type of injury: He was skateboarding down a ramp when he went to run off of his skateboard and felt his right knee twist and buckle underneath him  Date of surgery: NA  Type of surgery: NA.  Smoker: Yes  Request smoking cessation information: No    Pain Assessment  Patient Currently in Pain: Yes  0-10 Pain Scale: 5  Primary Pain Location: Knee    Ht 1.803 m (5' 11\")   Wt 77.1 kg (170 lb)   BMI 23.71 kg/m           Allergies   Allergen Reactions     Penicillins Unknown       Current Outpatient Medications   Medication     homatropine 2 % ophthalmic solution     HYDROcodone-acetaminophen (NORCO) 5-325 MG per tablet     polyvinyl alcohol (LIQUIFILM TEARS) 1.4 % ophthalmic solution     No current facility-administered medications for this visit.          Didi Manning, ATC    "

## 2020-10-12 NOTE — PATIENT INSTRUCTIONS
Please call Radiology scheduling at [phone] to schedule MRI.     Please call Orthopedic Clinic at [phone] to schedule follow up appointment to review MRI results.

## 2020-10-19 ENCOUNTER — ANCILLARY PROCEDURE (OUTPATIENT)
Dept: MRI IMAGING | Facility: CLINIC | Age: 40
End: 2020-10-19
Attending: ORTHOPAEDIC SURGERY

## 2020-10-19 DIAGNOSIS — M25.561 ACUTE PAIN OF RIGHT KNEE: ICD-10-CM

## 2020-10-19 PROCEDURE — 73721 MRI JNT OF LWR EXTRE W/O DYE: CPT | Mod: RT | Performed by: RADIOLOGY

## 2020-10-21 ENCOUNTER — TELEPHONE (OUTPATIENT)
Dept: ORTHOPEDICS | Facility: CLINIC | Age: 40
End: 2020-10-21

## 2020-10-21 NOTE — TELEPHONE ENCOUNTER
[facility] Call Center    Phone Message    May a detailed message be left on voicemail: yes     Reason for Call: Requesting Results   Name/type of test: MRI  Date of test: 10/19/2020  Was test done at a location other than [facility] (Please fill in the location if not [facility])?: No    Pt of Dr. Lamb calling for his MRI results      Action Taken: Message routed to:  [facility] Ortho    Travel Screening: Not Applicable

## 2020-10-21 NOTE — LETTER
RETURN TO WORK        2020            Patient:  Celso Mathis  :   1980  MRN:     [id number]  Physician: JENNIFER LAMB    Celso Mathis may return to work as able.      The next clinic appointment is scheduled for 10/26/2020.    Patient limitations:  Per patient's discretion        Electronically signed by Jennifer Lamb MD

## 2020-10-22 NOTE — TELEPHONE ENCOUNTER
[facility] Call Center    Phone Message    May a detailed message be left on voicemail: yes     Reason for Call: Other: Patient would like to speak with somebody on Dr. Oro care team in regards to his recent MRI. Please call patient back when able.     Action Taken: Message routed to:  [facility] ortho    Travel Screening: Not Applicable

## 2020-10-22 NOTE — TELEPHONE ENCOUNTER
[facility] Call Center    Phone Message    May a detailed message be left on voicemail: yes     Reason for Call: Other: Pt is returning Michelle's call, pt is stating he can't go back to work until he gets the results, pt is asking to have Michelle please call him back today     Action Taken: Message routed to:  [facility] Ortho    Travel Screening: Not Applicable

## 2020-10-22 NOTE — TELEPHONE ENCOUNTER
Notified patient of MRI results at the direction of Dr. Lamb as below:    The MRI shows that the patient has a complete tear of his ACL. There is also a suspicious area of the medial meniscus that the MRI does not clearly show that may be a tear and will be examined during surgery. The rest of the patient's ligaments look okay. Dr. Lamb's recommendation is ACL reconstruction and possible meniscus surgery. Patient is allowed to go back to work as able. He should do no side to side, start-stop or pivoting sports.     Patient was offered a virtual visit with Dr. Lamb on Monday 10/26 at 10:00 am to discuss surgery. Patient accepted, appointment made. Patient also requests a letter for his employer be sent to him via e-mail; this was done. Patient's e-mail is [email or web address].    Patient expresses understanding of all of the above.

## 2020-10-26 ENCOUNTER — VIRTUAL VISIT (OUTPATIENT)
Dept: ORTHOPEDICS | Facility: CLINIC | Age: 40
End: 2020-10-26

## 2020-10-26 DIAGNOSIS — M23.8X1 DERANGEMENT OF RIGHT KNEE LIGAMENT: Primary | ICD-10-CM

## 2020-10-26 PROCEDURE — 99213 OFFICE O/P EST LOW 20 MIN: CPT | Mod: 95 | Performed by: ORTHOPAEDIC SURGERY

## 2020-10-26 NOTE — LETTER
"    10/26/2020         RE: Celso Mathis  [address]  [facility] [address]        Dear Colleague,    Thank you for referring your patient, Celso Mathis, to the [facility] [place]. Please see a copy of my visit note below.    Celso Mathis is a 40 year old male who is being evaluated via a billable video visit.      The patient has been notified of following:     \"This video visit will be conducted via a call between you and your physician/provider. We have found that certain health care needs can be provided without the need for an in-person physical exam.  This service lets us provide the care you need with a video conversation.  If a prescription is necessary we can send it directly to your pharmacy.  If lab work is needed we can place an order for that and you can then stop by our lab to have the test done at a later time.    Video visits are billed at different rates depending on your insurance coverage.  Please reach out to your insurance provider with any questions.    If during the course of the call the physician/provider feels a video visit is not appropriate, you will not be charged for this service.\"    Patient has given verbal consent for Video visit? Yes  How would you like to obtain your AVS? Mail a copy  If you are dropped from the video visit, the video invite should be resent to: Text to cell phone: [phone]  Will anyone else be joining your video visit? Bárbara Houston returns for a virtual visit today to review his MRI.  He denies any intercurrent trauma.  No new events.    No examination was completed today as this was a virtual visit.    MRI was reviewed today which shows a complete tear of the anterior cruciate ligament.  There may be an injury to the medial meniscus however I do not think this represents a true tear.  Collateral ligaments are intact.  Some edema is noted within the patellar tendon and just anterior to it which may represent patellar " tendinitis    Clinical assessment: Grade 3 rupture of the anterior cruciate ligament right knee    Plan: Long discussion today with Celso.  I reviewed with him his diagnosis and potential treatment options.  At this time I have offered him ACL reconstruction I think would be inclined to do a hamstring tendon given the change was seen the patellar tendon.    I discussed with him the risk benefits complication techniques and alternatives.  We reviewed the expected course of recovery.  I will have my surgery scheduler call him to talk about surgery times.  He does have some questions regarding insurance coverage as well as timing of surgery and I will work this out in advance.  I am happy to see him again for an in person visit if he so desires.      Video-Visit Details    Type of service:  Video Visit    Video Start Time: 1000  Video End Time: 1020    Originating Location (pt. Location): Home    Distant Location (provider location):  [facility]     Platform used for Video Visit: [facility]    Ron Lamb MD            Again, thank you for allowing me to participate in the care of your patient.        Sincerely,        Ron Lamb MD

## 2020-10-26 NOTE — PROGRESS NOTES
"Celso Mathis is a 40 year old male who is being evaluated via a billable video visit.      The patient has been notified of following:     \"This video visit will be conducted via a call between you and your physician/provider. We have found that certain health care needs can be provided without the need for an in-person physical exam.  This service lets us provide the care you need with a video conversation.  If a prescription is necessary we can send it directly to your pharmacy.  If lab work is needed we can place an order for that and you can then stop by our lab to have the test done at a later time.    Video visits are billed at different rates depending on your insurance coverage.  Please reach out to your insurance provider with any questions.    If during the course of the call the physician/provider feels a video visit is not appropriate, you will not be charged for this service.\"    Patient has given verbal consent for Video visit? Yes  How would you like to obtain your AVS? Mail a copy  If you are dropped from the video visit, the video invite should be resent to: Text to cell phone: [phone]  Will anyone else be joining your video visit? Bárbara Houston returns for a virtual visit today to review his MRI.  He denies any intercurrent trauma.  No new events.    No examination was completed today as this was a virtual visit.    MRI was reviewed today which shows a complete tear of the anterior cruciate ligament.  There may be an injury to the medial meniscus however I do not think this represents a true tear.  Collateral ligaments are intact.  Some edema is noted within the patellar tendon and just anterior to it which may represent patellar tendinitis    Clinical assessment: Grade 3 rupture of the anterior cruciate ligament right knee    Plan: Long discussion today with Celso.  I reviewed with him his diagnosis and potential treatment options.  At this time I have offered him ACL reconstruction I think " would be inclined to do a hamstring tendon given the change was seen the patellar tendon.    I discussed with him the risk benefits complication techniques and alternatives.  We reviewed the expected course of recovery.  I will have my surgery scheduler call him to talk about surgery times.  He does have some questions regarding insurance coverage as well as timing of surgery and I will work this out in advance.  I am happy to see him again for an in person visit if he so desires.      Video-Visit Details    Type of service:  Video Visit    Video Start Time: 1000  Video End Time: 1020    Originating Location (pt. Location): Home    Distant Location (provider location):  [facility]     Platform used for Video Visit: Lia Lamb MD

## 2020-10-26 NOTE — LETTER
Date:October 27, 2020      Patient was self referred, no letter generated. Do not send.        [facility] Health Information

## 2020-10-26 NOTE — NURSING NOTE
AVS mailed to patient's home address. Patient will be contacted by Michelle, ya-op , to discuss surgery dates.

## 2020-10-27 ENCOUNTER — TELEPHONE (OUTPATIENT)
Dept: ORTHOPEDICS | Facility: CLINIC | Age: 40
End: 2020-10-27

## 2020-10-27 NOTE — TELEPHONE ENCOUNTER
Phoned patient to schedule surgery with Dr Lamb. Patient stated he is going to wait to schedule for a couple of months, would like to get his insurance set up, and would like to continue working until it gets too cold outside. Patient will call back when he is ready to schedule. Surgery packet mailed to patient.

## 2021-03-22 ENCOUNTER — OFFICE VISIT (OUTPATIENT)
Dept: ORTHOPEDICS | Facility: CLINIC | Age: 41
End: 2021-03-22
Payer: COMMERCIAL

## 2021-03-22 VITALS — WEIGHT: 169 LBS | BODY MASS INDEX: 23.66 KG/M2 | HEIGHT: 71 IN

## 2021-03-22 DIAGNOSIS — M23.8X1 DERANGEMENT OF RIGHT KNEE LIGAMENT: Primary | ICD-10-CM

## 2021-03-22 PROCEDURE — 99214 OFFICE O/P EST MOD 30 MIN: CPT | Mod: GC | Performed by: STUDENT IN AN ORGANIZED HEALTH CARE EDUCATION/TRAINING PROGRAM

## 2021-03-22 ASSESSMENT — MIFFLIN-ST. JEOR: SCORE: 1703.71

## 2021-03-22 NOTE — LETTER
3/22/2021         RE: Celso Mathis  [address]  [facility] [address]        Dear Colleague,    Thank you for referring your patient, Celso Mathis, to the [facility] [place]. Please see a copy of my visit note below.      HISTORY:  No returns again today.  He has a known right ACL tear.  He did not previously have insurance but now does who is returning to continue his treatment.  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  However, he is concerned about future events or injuries.  He also has not pushed his knee with skateboarding which is how he originally tore his ACL.    EXAM:   General: Awake, Alert, and oriented. Articulates and communicates with a normal affect     Right lower Extremity/Knee Exam:    2B Lachman.  Range of motion 0-130.  Medial or lateral joint line pain    IMAGING:  Radiographs of the right knee from 10/8/2020 were independently reviewed by me and findings were discussed with the patient today. The imaging demonstrates no degeneration and no fractures.    My the right knee from 10/19/2020 is independently reviewed and discussed.  Reveals a complete ACL tear.  Meniscus appears intact.    ASSESSMENT:  41 year old male with a torn right ACL    PLAN:   The risks, benefits, and alternatives to ACL reconstruction were discussed.  Different graft options and the benefits of each were also discussed.  He is interested in a hamstring autograft ACL reconstruction because he does work on his knees frequently.  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      Cristian Bravo MD  Fellow, Sports Medicine & Shoulder  [facility]        Patient seen and examined with the resident.     Assesment: Right ACL tear    Plan: Offered Right ACL reconstruction HS autograft    I discussed with the patient the risks, benefits,  complications and techniques of surgery as well as the natural history of ACL tears and the alternative treatment options.    The risks include, but are not limited to the risk of death and risk of a myocardial infarction, risk of bleeding and a risk of infection, risk of nerve damage and a risk of muscle damage, stiffness, instability, continued pain or worsening pain and retear of ACL, need for  Future surgery, failure of ACL graft.    The patient was provided an opportunity to ask questions and these were answered.      I agree with history, physical and imaging as well as the assessment and plan as detailed by Dr. Bravo.         Again, thank you for allowing me to participate in the care of your patient.        Sincerely,        Ron Lamb MD

## 2021-03-22 NOTE — PROGRESS NOTES
Patient seen and examined with the resident.     Assesment: Right ACL tear    Plan: Offered Right ACL reconstruction HS autograft    I discussed with the patient the risks, benefits, complications and techniques of surgery as well as the natural history of ACL tears and the alternative treatment options.    The risks include, but are not limited to the risk of death and risk of a myocardial infarction, risk of bleeding and a risk of infection, risk of nerve damage and a risk of muscle damage, stiffness, instability, continued pain or worsening pain and retear of ACL, need for  Future surgery, failure of ACL graft.    The patient was provided an opportunity to ask questions and these were answered.      I agree with history, physical and imaging as well as the assessment and plan as detailed by Dr. Bravo.

## 2021-03-22 NOTE — LETTER
Date:March 23, 2021      Patient was self referred, no letter generated. Do not send.        [facility] Health Information

## 2021-03-22 NOTE — NURSING NOTE
"Reason For Visit:   Chief Complaint   Patient presents with     RECHECK     Right knee ACL     Occupation .  Currently working? Yes.  Date of injury: 10/7/20  Type of injury: He was skateboarding down a ramp when he went to run off of his skateboard and felt his right knee twist and buckle underneath him.    Has been able to continue with skateboarding, surfing and exercising. Does not report episodes of instability. No issues with work. Reports pain only with full deep quad stretch.   Smoker: Yes  Request smoking cessation information: No    Pain Assessment  Patient Currently in Pain: Yes  0-10 Pain Scale: 9  Primary Pain Location: Knee    Ht 1.803 m (5' 11\")   Wt 76.7 kg (169 lb)   BMI 23.57 kg/m           Allergies   Allergen Reactions     Penicillins Unknown       Current Outpatient Medications   Medication     homatropine 2 % ophthalmic solution     HYDROcodone-acetaminophen (NORCO) 5-325 MG per tablet     polyvinyl alcohol (LIQUIFILM TEARS) 1.4 % ophthalmic solution     No current facility-administered medications for this visit.          Carolyn Luciano, ATC    "

## 2021-03-22 NOTE — PROGRESS NOTES
HISTORY:  No returns again today.  He has a known right ACL tear.  He did not previously have insurance but now does who is returning to continue his treatment.  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  However, he is concerned about future events or injuries.  He also has not pushed his knee with skateboarding which is how he originally tore his ACL.    EXAM:   General: Awake, Alert, and oriented. Articulates and communicates with a normal affect     Right lower Extremity/Knee Exam:    2B Lachman.  Range of motion 0-130.  Medial or lateral joint line pain    IMAGING:  Radiographs of the right knee from 10/8/2020 were independently reviewed by me and findings were discussed with the patient today. The imaging demonstrates no degeneration and no fractures.    My the right knee from 10/19/2020 is independently reviewed and discussed.  Reveals a complete ACL tear.  Meniscus appears intact.    ASSESSMENT:  41 year old male with a torn right ACL    PLAN:   The risks, benefits, and alternatives to ACL reconstruction were discussed.  Different graft options and the benefits of each were also discussed.  He is interested in a hamstring autograft ACL reconstruction because he does work on his knees frequently.  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      Cristian Bravo MD  Fellow, Sports Medicine & Shoulder  [facility]

## 2021-03-23 ENCOUNTER — HOSPITAL ENCOUNTER (OUTPATIENT)
Facility: AMBULATORY SURGERY CENTER | Age: 41
End: 2021-03-23
Attending: ORTHOPAEDIC SURGERY
Payer: COMMERCIAL

## 2021-03-23 DIAGNOSIS — M23.8X1 DERANGEMENT OF RIGHT KNEE LIGAMENT: ICD-10-CM

## 2022-10-10 NOTE — TELEPHONE ENCOUNTER
MEDICAL RECORDS REQUEST   [facility]  Urology Clinic  [address] [place] [address]  PHONE: [phone]  Fax: [phone]        FUTURE VISIT INFORMATION                                                   Celso Mathis YOB: 1980 scheduled for future visit at [facility]    APPOINTMENT INFORMATION:    Date: 2023    Provider:  Satya Harris MD    Reason for Visit/Diagnosis: Vasectomy Consult    RECORDS REQUESTED FOR VISIT                                                     NOTES  STATUS/DETAILS   MEDICATION LIST  yes   LABS     URINALYSIS (UA)  yes,      PRE-VISIT CHECKLIST      Record collection complete Yes   Appointment appropriately scheduled           (right time/right provider) Yes   Joint diagnostic appointment coordinated correctly          (ensure right order & amount of time) Yes   MyChart activation If no, please explain pending   Questionnaire complete If no, please explain pending

## 2023-01-12 ENCOUNTER — OFFICE VISIT (OUTPATIENT)
Dept: UROLOGY | Facility: CLINIC | Age: 43
End: 2023-01-12
Payer: COMMERCIAL

## 2023-01-12 ENCOUNTER — PRE VISIT (OUTPATIENT)
Dept: UROLOGY | Facility: CLINIC | Age: 43
End: 2023-01-12

## 2023-01-12 VITALS
HEIGHT: 71 IN | SYSTOLIC BLOOD PRESSURE: 135 MMHG | DIASTOLIC BLOOD PRESSURE: 83 MMHG | WEIGHT: 169 LBS | HEART RATE: 59 BPM | BODY MASS INDEX: 23.66 KG/M2

## 2023-01-12 DIAGNOSIS — Z30.09 ENCOUNTER FOR VASECTOMY COUNSELING: Primary | ICD-10-CM

## 2023-01-12 PROCEDURE — 99202 OFFICE O/P NEW SF 15 MIN: CPT | Performed by: UROLOGY

## 2023-01-12 NOTE — NURSING NOTE
"Chief Complaint   Patient presents with     Consult     Vasectomy       Height 1.803 m (5' 11\"), weight 76.7 kg (169 lb). Body mass index is 23.57 kg/m .    Patient Active Problem List   Diagnosis     Rhabdomyolysis     Hematoma of hip     Derangement of right knee ligament       Allergies   Allergen Reactions     Pcn [Penicillins] Unknown       Current Outpatient Medications   Medication Sig Dispense Refill     homatropine 2 % ophthalmic solution Place 1-2 drops into the right eye 3 times daily (Patient not taking: Reported on 3/22/2021) 1 mL 0     HYDROcodone-acetaminophen (NORCO) 5-325 MG per tablet Take 1 tablet by mouth every 6 hours as needed for moderate to severe pain (Patient not taking: Reported on 3/22/2021) 12 tablet 0     polyvinyl alcohol (LIQUIFILM TEARS) 1.4 % ophthalmic solution Place 1 drop into the right eye as needed for dry eyes (Patient not taking: Reported on 3/22/2021) 15 mL 0       Social History     Tobacco Use     Smoking status: Every Day     Packs/day: 1.00     Types: Cigarettes     Smokeless tobacco: Never   Substance Use Topics     Alcohol use: No     Alcohol/week: 17.5 standard drinks     Types: 21 Cans of beer per week     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

## 2023-01-12 NOTE — PROGRESS NOTES
"CC: Desires sterilization, consult for vasectomy    HPI: Celso Mathis is a 42 year old male with 3 children, (also stepkids)  and he is intersted in getting a vasectomy for sterilization.        PMH:   No chronic medical problems   No major surgery.    FAMILY HX:   No history of coagulopathies.    ALLERGIES:      Allergies   Allergen Reactions     Pcn [Penicillins] Unknown       MEDS:   No prescription medications at this time.      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 Well nourished.   PSYCH: Normal mood and affect.   NEURO: Normal gait, no focal deficits.   EYES: Anicteric, EOMI, PERR  ENT: Neck supple, no lymphadenopathy, mucosae moist, no thrush.   CARDIOPULMONARY: Breathing non-labored, pulse regular, no peripheral edema.   GI: Abdomen soft, non-tender, surgical scars: none   MUSCULOSKELETAL: Normal limb proportions, no muscle wasting, no contractures.    SKIN: Normal virilized hair distribution, no lesions, warts or rashes over genitalia, abdomen extremities or face.   HEME/LYMPH: No echymosis, no lymphadenopathy in groin or neck, no lymphedema.     EXAM:  Phallus    circumcised, meatus adequate, no plaques palpated.   Testes descended, consistency is normal. No intra-testicular masses.  Epididymes present.  Vas present bilateraly, both very easy to find.  ANGELA deferred.        I discussed with him at length the risks and benefits of the procedure. He understands that this is a sterilization procedure, and not reversible contraception. He understands that reversals, while " possible, are not guaranteed to work and fairly complex. I discussed with him the option of sperm cryopreservation.     I stressed that he continues to be fertile in the post-operative period, and that he should continue using other contraceptive methods, such as a condom, until he obtains a semen analysis and we review the results to confirm success of the procedure and infertility. I also stressed to him that recanalization and pregnancy can occur in about 1 per thousand cases, possibly more even after we clear him with a semen analysis showing no motile sperm. I counseled him on the ya-operative risks of bleeding, infection, pain.  I described to him post-vasectomy pain syndrome that can occur in about 1 to 2% of men undergoing vasectomy.     We also discussed recovery times (typically days if no complications) and post-operative care including use of ice packs, pain medication and wound care.      Schedule vasectomy procedure in clinic.   MA consent form signed.      Additional Coding Information:    Problems:  one acute uncomplicated illness or injury    Data Reviewed  Minimal or none    Level of risk:   low risk (e.g., OTC medication or observation, minor surgery without risks)    Time spent:    10 minutes spent on the date of the encounter doing chart review, history and exam, documentation and further activities as noted above.

## 2023-01-12 NOTE — LETTER
Date:January 13, 2023      Provider requested that no letter be sent. Do not send.       [facility]

## 2023-01-12 NOTE — PATIENT INSTRUCTIONS
Please schedule a vasectomy with Dr. Harris at your convenience (must be later than February 12th).    It was a pleasure meeting with you today.  Thank you for allowing me and my team the privilege of caring for you today.  YOU are the reason we are here, and I truly hope we provided you with the excellent service you deserve.  Please let us know if there is anything else we can do for you so that we can be sure you are leaving completely satisfied with your care experience.

## 2023-01-12 NOTE — LETTER
"1/12/2023       RE: Celso Mathis  [address]  [facility] [address]     Dear Colleague,    Thank you for referring your patient, Celso Mathis, to the [facility] [place] at [facility]. Please see a copy of my visit note below.    CC: Desires sterilization, consult for vasectomy    HPI: Celso Mathis is a 42 year old male with 3 children, (also stepkids)  and he is intersted in getting a vasectomy for sterilization.        PMH:   No chronic medical problems   No major surgery.    FAMILY HX:   No history of coagulopathies.    ALLERGIES:      Allergies   Allergen Reactions     Pcn [Penicillins] Unknown       MEDS:   No prescription medications at this time.      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 Well nourished.   PSYCH: Normal mood and affect.   NEURO: Normal gait, no focal deficits.   EYES: Anicteric, EOMI, PERR  ENT: Neck supple, no lymphadenopathy, mucosae moist, no thrush.   CARDIOPULMONARY: Breathing non-labored, pulse regular, no peripheral edema.   GI: Abdomen soft, non-tender, surgical scars: none   MUSCULOSKELETAL: Normal limb proportions, no muscle wasting, no contractures.    SKIN: Normal virilized hair distribution, no lesions, warts or rashes over genitalia, abdomen extremities or face.   HEME/LYMPH: No echymosis, no lymphadenopathy in groin or neck, no lymphedema.     EXAM:  Phallus    circumcised, meatus adequate, no plaques palpated.   Testes descended, consistency is normal. No " intra-testicular masses.  Epididymes present.  Vas present bilateraly, both very easy to find.  ANGELA deferred.        I discussed with him at length the risks and benefits of the procedure. He understands that this is a sterilization procedure, and not reversible contraception. He understands that reversals, while possible, are not guaranteed to work and fairly complex. I discussed with him the option of sperm cryopreservation.     I stressed that he continues to be fertile in the post-operative period, and that he should continue using other contraceptive methods, such as a condom, until he obtains a semen analysis and we review the results to confirm success of the procedure and infertility. I also stressed to him that recanalization and pregnancy can occur in about 1 per thousand cases, possibly more even after we clear him with a semen analysis showing no motile sperm. I counseled him on the ya-operative risks of bleeding, infection, pain.  I described to him post-vasectomy pain syndrome that can occur in about 1 to 2% of men undergoing vasectomy.     We also discussed recovery times (typically days if no complications) and post-operative care including use of ice packs, pain medication and wound care.      Schedule vasectomy procedure in clinic.   MA consent form signed.      Additional Coding Information:    Problems:  one acute uncomplicated illness or injury    Data Reviewed  Minimal or none    Level of risk:   low risk (e.g., OTC medication or observation, minor surgery without risks)    Time spent:    10 minutes spent on the date of the encounter doing chart review, history and exam, documentation and further activities as noted above.       Again, thank you for allowing me to participate in the care of your patient.      Sincerely,    Satya Harris MD

## 2023-05-09 ENCOUNTER — PRE VISIT (OUTPATIENT)
Dept: UROLOGY | Facility: CLINIC | Age: 43
End: 2023-05-09
Payer: COMMERCIAL

## 2023-05-09 NOTE — TELEPHONE ENCOUNTER
Patient coming in for vasectomy procedure.    Unable to reach patient to discuss vasectomy prep which includes: stopping all blood thinners for seven days prior to procedure, eating a meal prior to procedure, and shaving the hair from the base of the penis and scrotum the night prior to procedure.    LVM for patient to call clinic back to review.    Fiona Ramos LPN  05/09/23  3:04 PM

## 2023-05-15 ENCOUNTER — TELEPHONE (OUTPATIENT)
Dept: UROLOGY | Facility: CLINIC | Age: 43
End: 2023-05-15
Payer: COMMERCIAL

## 2023-05-15 NOTE — TELEPHONE ENCOUNTER
Attempted to reach pt to confirm he had received our last VM going over the prep for his upcoming vasectomy procedure.    Which includes: stopping all blood thinners for seven days prior to procedure, eating a meal prior to procedure, and shaving the hair from the base of the penis and scrotum the night prior to procedure.      Janiya Finley MA  May 15, 2023  11:55 AM

## 2023-05-19 ENCOUNTER — OFFICE VISIT (OUTPATIENT)
Dept: UROLOGY | Facility: CLINIC | Age: 43
End: 2023-05-19
Payer: COMMERCIAL

## 2023-05-19 VITALS
HEIGHT: 70 IN | DIASTOLIC BLOOD PRESSURE: 69 MMHG | WEIGHT: 165 LBS | BODY MASS INDEX: 23.62 KG/M2 | HEART RATE: 54 BPM | SYSTOLIC BLOOD PRESSURE: 118 MMHG

## 2023-05-19 DIAGNOSIS — Z30.2 ENCOUNTER FOR VASECTOMY: Primary | ICD-10-CM

## 2023-05-19 PROCEDURE — 55250 REMOVAL OF SPERM DUCT(S): CPT | Performed by: UROLOGY

## 2023-05-19 ASSESSMENT — PAIN SCALES - GENERAL: PAINLEVEL: NO PAIN (0)

## 2023-05-19 NOTE — LETTER
5/19/2023       RE: Celso Mathis  [address]  [facility] [address]     Dear Colleague,    Thank you for referring your patient, Celso Mathis, to the [facility] [place] at [facility]. Please see a copy of my visit note below.    Procedure: Vasectomy bilateral.   Anesthesia: Local lidocaine injection by surgeon.  Surgeon: ROGER Harris M.D.   Assistant:  none    Description of procedure: After the patient received education material regarding vasectomy, including risks and benefits, we proceeded with obtaining consent and proceeded with the surgery. He understands that there is a risk of late failure from recanalization. He understands that he is fertile until he has completed one or more semen analyses and he is given clearance from us for unprotected intercourse.  He understands that we will contact regarding the results but it is his responsibility to make sure he is cleared before having unprotected intercourse.  I have discussed with him other risks including bleeding, infection, acute and possible long-term pain.    The right vas was ligated first.  This was done using the standard technique by grasping it with a three-finger  and lifting it up to the skin.  A small amount of lidocaine was infiltrated into the skin and vasal sheath.  The skin was punctured and dilated bluntly using the scalpel-less dissector.  The sheath was identified and a rigid clamp was passed around the vas which was then lifted out of the incision.  The vas was cleaned off from the deferential vessels and the sheath, and cautery was used to divide the vas between mosquitos.  A small segment was removed and discarded.  The lumen of the vas was cannulated to confirm its identity, and the lumens of both ends were cauterized.  Pen cautery was used sparingly/as needed for minor bleeders.  A facial interposition was then performed with a single suture of 4-0  chromic. The skin defect was closed with a 4-0 chromic interrupted suture after confirming adequate hemostasis.       We then turned our attention to the left side and a similar technique was performed. This involved division, excision of a segment, cautery of the lumens, and fascial interposition.    There were no hematomas noted at the end of the procedure.  The patient tolerated the procedure well.    He was advised to return for a post vasectomy semen check in 2-3 months, and that he is not sterile and must continue to use contraception until we tell him otherwise (based on follow-up semen specimen(s)).    Plan:  -Post vasectomy semen analysis in 2-3 months   -ice packs to scrotum X 24h  -shower ok tomorrow  -OTC analgesics recommended     Moni MEYERS

## 2023-05-19 NOTE — PATIENT INSTRUCTIONS
What Can I Expect After My Vasectomy?     Your scrotum may be swollen and bruised. We suggest you:  - Raise the area and apply ice packs (or frozen vegetables). Use the ice packs for 20 minutes on and 20 minutes off for 24 to 36 hours.  - Wear a jock strap or tight briefs for support for a week.  - Limit your activity for the first 24 to 36 hours. Wait up to 48 hours, if you feel the need. No heavy lifting for 1 week.     You should be able to return to work the next day, unless you do heavy physical work.     You can safely ejaculate (have a sexual climax) in about one week, or when it feels comfortable.    Risk of pregnancy    After your vasectomy, you can still get your partner pregnant for three months or more. Use extra birth control, such as condoms, until tests show that you are sterile (no live sperm).    Vasectomy is not 100 percent reliable. Pregnancy may occur for about 1 out of 2000 men even after testing shows zero sperm count.    Can a vasectomy be reversed?    Vasectomy is meant to be birth control that lasts a lifetime. If you change your mind, we can try to reverse it with surgery. But this will not be successful in every case.    Sperm count test    You will have a sperm-count test after the vasectomy. You should have had at least 20 ejaculations (discharges of semen) since your surgery. It will be a few months before you are sterile. Ten to twelve weeks after your surgery, schedule a sperm count test. Call [phone] to schedule. We will call you in 2 weeks with the results.    If you have any questions, please contact us:    [facility] (nurse line): [phone]

## 2023-05-19 NOTE — NURSING NOTE
"Chief Complaint   Patient presents with     Sterilization     Vasectomy       Blood pressure 118/69, pulse 54, height 1.778 m (5' 10\"), weight 74.8 kg (165 lb). Body mass index is 23.68 kg/m .    Patient Active Problem List   Diagnosis     Rhabdomyolysis     Hematoma of hip     Derangement of right knee ligament       Allergies   Allergen Reactions     Grass Extracts [Gramineae Pollens] Other (See Comments)     Pcn [Penicillins] Unknown       No current outpatient medications on file.       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    Pre-procedure:  1. Patient ID Verified with 2 identifiers (Donna and  or MRN) : YES    2. Procedure and site verified with patient/designee (when able) : YES    3. Accurate consent documentation in medical record : YES    4. H&P (or appropriate assessment) documented in medical record : N/A  H&P must be up to 30 days prior to procedure an updated within 24 hours of                 Procedure as applicable.     5. Relevant diagnostic and radiology test results appropriately labeled and displayed as applicable : YES    6. Blood products, implants, devices, and/or special equipment available for the procedure as applicable : YES    7. Procedure site(s) marked with provider initials [Exclusions: none] : NO    8. Marking not required. Reason : Yes  Procedure does not require site marking    Time Out:     Time-Out performed immediately prior to starting procedure, including verbal and active participation of all team members addressing: YES    1. Correct patient identity.  2. Confirmed that " the correct side and site are marked.  3. An accurate procedure to be done.  4. Agreement on the procedure to be done.  5. Correct patient position.  6. Relevant images and results are properly labeled and appropriately displayed.  7. The need to administer antibiotics or fluids for irrigation purposes during the procedure as applicable.  8. Safety precautions based on patient history or medication use.    During Procedure: Verification of correct person, site, and procedure occurs any time the responsibility for care of the patient is transferred to another member of the care team.    The following medication was given:     MEDICATION:  Lidocaine without epinephrine 2%  ROUTE: administered by physician - scrotal   SITE: administered by physician - scrotal   DOSE: 5 mL  LOT #: [id number]  : Kidzloop  EXPIRATION DATE: 02/26  NDC#: 29643-095-23   Was there drug waste? Yes  Amount of drug waste (mL): 15 mL.  Reason for waste:  Single use vial    Prior to injection, verified patient identity using patient's name and date of birth.  Due to injection administration, patient instructed to remain in clinic for 15 minutes  afterwards, and to report any adverse reaction to me immediately.    Drug Amount Wasted:  Yes: 15 mL (20 mg/ml)  Vial/Syringe: Single dose vial      Edmundo Hylton, EMT  5/19/2023  9:22 AM

## 2023-05-19 NOTE — PROGRESS NOTES
Procedure: Vasectomy bilateral.   Anesthesia: Local lidocaine injection by surgeon.  Surgeon: ROGER Harris M.D.   Assistant:  none    Description of procedure: After the patient received education material regarding vasectomy, including risks and benefits, we proceeded with obtaining consent and proceeded with the surgery. He understands that there is a risk of late failure from recanalization. He understands that he is fertile until he has completed one or more semen analyses and he is given clearance from us for unprotected intercourse.  He understands that we will contact regarding the results but it is his responsibility to make sure he is cleared before having unprotected intercourse.  I have discussed with him other risks including bleeding, infection, acute and possible long-term pain.    The right vas was ligated first.  This was done using the standard technique by grasping it with a three-finger  and lifting it up to the skin.  A small amount of lidocaine was infiltrated into the skin and vasal sheath.  The skin was punctured and dilated bluntly using the scalpel-less dissector.  The sheath was identified and a rigid clamp was passed around the vas which was then lifted out of the incision.  The vas was cleaned off from the deferential vessels and the sheath, and cautery was used to divide the vas between mosquitos.  A small segment was removed and discarded.  The lumen of the vas was cannulated to confirm its identity, and the lumens of both ends were cauterized.  Pen cautery was used sparingly/as needed for minor bleeders.  A facial interposition was then performed with a single suture of 4-0 chromic. The skin defect was closed with a 4-0 chromic interrupted suture after confirming adequate hemostasis.       We then turned our attention to the left side and a similar technique was performed. This involved division, excision of a segment, cautery of the lumens, and fascial interposition.    There were no  hematomas noted at the end of the procedure.  The patient tolerated the procedure well.    He was advised to return for a post vasectomy semen check in 2-3 months, and that he is not sterile and must continue to use contraception until we tell him otherwise (based on follow-up semen specimen(s)).    Plan:  -Post vasectomy semen analysis in 2-3 months   -ice packs to scrotum X 24h  -shower ok tomorrow  -OTC analgesics recommended     Moni MEYERS

## (undated) RX ORDER — LIDOCAINE HYDROCHLORIDE 20 MG/ML
INJECTION, SOLUTION INFILTRATION; PERINEURAL
Status: DISPENSED
Start: 2023-05-19